# Patient Record
Sex: MALE | Race: BLACK OR AFRICAN AMERICAN | URBAN - METROPOLITAN AREA
[De-identification: names, ages, dates, MRNs, and addresses within clinical notes are randomized per-mention and may not be internally consistent; named-entity substitution may affect disease eponyms.]

---

## 2017-08-25 ENCOUNTER — OFFICE VISIT (OUTPATIENT)
Dept: FAMILY MEDICINE CLINIC | Age: 37
End: 2017-08-25

## 2017-08-25 VITALS
HEIGHT: 67 IN | BODY MASS INDEX: 24.74 KG/M2 | OXYGEN SATURATION: 98 % | SYSTOLIC BLOOD PRESSURE: 124 MMHG | TEMPERATURE: 97.9 F | RESPIRATION RATE: 17 BRPM | DIASTOLIC BLOOD PRESSURE: 85 MMHG | HEART RATE: 75 BPM | WEIGHT: 157.6 LBS

## 2017-08-25 DIAGNOSIS — Z71.84 TRAVEL ADVICE ENCOUNTER: ICD-10-CM

## 2017-08-25 DIAGNOSIS — Z00.00 WELL ADULT EXAM: Primary | ICD-10-CM

## 2017-08-25 DIAGNOSIS — R03.0 ELEVATED BP WITHOUT DIAGNOSIS OF HYPERTENSION: ICD-10-CM

## 2017-08-25 NOTE — PROGRESS NOTES
Chief Complaint   Patient presents with   Marbella Iyer [facility]     1. Have you been to the ER, urgent care clinic since your last visit? Hospitalized since your last visit? No    2. Have you seen or consulted any other health care providers outside of the [address] [place] [address] since your last visit? Include any pap smears or colon screening.  No

## 2017-08-25 NOTE — PROGRESS NOTES
HPI  Emmette Goldmann is a 40 y.o. male who presents for a physical exam. No complaints today. Plans to travel to to [facility] on September 2nd with family. Patient states all vaccinations are utd including hep A and tetanus vaccine, yellow fever 2 years ago, meningococcal 5 years ago. Received Flu shot last month. No significant medical h/o does not smoke or drinks. Exercises daily for 4 miles. No h/o BP in family      Allergies:   No Known Allergies    Meds:   Current Outpatient Prescriptions   Medication Sig Dispense Refill    MULTIVITAMIN PO Take  by mouth.  ergocalciferol (ERGOCALCIFEROL) 50,000 unit capsule Take 1 Cap by mouth every seven (7) days. 4 Cap 3       PMH:  No past medical history on file. SH:  Smoker:  History   Smoking Status    Never Smoker   Smokeless Tobacco    Never Used       ETOH:   History   Alcohol Use    Yes     Comment: once every two weeks one beer       FH:   No family history on file. ROS: Positive for Items in bold:   Constitutional: headache, fever, fatigue, weight loss or weight gain      Eyes: redness, pruritis, pain, visual changes, swelling, or discharge      Ears: ear pain, loss or changes in hearing     Nose: congestion, rhinorrhea  Oropharynx: sore throat, lesions in throat  Cardiac: chest pain      Resp: cough or shortness of breath      GI: nausea, vomiting, constipation, diarrhea, blood in stool  : frequency, urgency, dysuria, hematuria   Neuro: dizziness, numbness, tingling  Psych: anxiety, depression, stress     Physical Exam:  Visit Vitals    /85 (BP 1 Location: Left arm, BP Patient Position: Sitting)    Pulse 75    Temp 97.9 °F (36.6 °C) (Oral)    Resp 17    Ht 5' 7\" (1.702 m)    Wt 157 lb 9.6 oz (71.5 kg)    SpO2 98%    BMI 24.68 kg/m2       Gen: No apparent distress. Alert and oriented and responds to all questions appropriately. Eyes: Conjunctiva clear, extraocular movements are intact.    Ear: External ears are normal. Hearing grossly normal b/l. Nose: Turbinates are within normal limits. No drainage. Oropharynx: No oral lesions or exudates. Neck: Supple; no masses; no thyromegaly appreciated  Lungs: Respirations unlabored; clear to auscultation bilaterally  Cardio: Regular, rate, and rhythm without murmurs, gallops or rubs   Abdomen: Soft; nontender; nondistended; normoactive bowel sounds; no masses or organomegaly  Neurologic: No focal neurologic deficits. Strength and sensation grossly intact. Coordination and gait grossly intact. Ext: Well perfused. No edema. Skin: No obvious rashes. Lab Results   Component Value Date/Time    Sodium 138 02/24/2015 11:20 AM    Potassium 4.4 02/24/2015 11:20 AM    Chloride 96 02/24/2015 11:20 AM    CO2 28 02/24/2015 11:20 AM    Glucose 88 02/24/2015 11:20 AM    BUN 13 02/24/2015 11:20 AM    Creatinine 0.92 02/24/2015 11:20 AM    BUN/Creatinine ratio 14 02/24/2015 11:20 AM    GFR est  02/24/2015 11:20 AM    GFR est non- 02/24/2015 11:20 AM    Calcium 9.9 02/24/2015 11:20 AM     Lab Results   Component Value Date/Time    Cholesterol, total 222 02/24/2015 11:20 AM    HDL Cholesterol 37 02/24/2015 11:20 AM    LDL, calculated 155 02/24/2015 11:20 AM    VLDL, calculated 30 02/24/2015 11:20 AM    Triglyceride 150 02/24/2015 11:20 AM     I have personally reviewed the labs results        Assessment and Plan:   Rahul Terrazas is a 40 y.o. male who presents for his physical.       ICD-10-CM ICD-9-CM    1. Well adult exam Z00.00 V70.0 CBC W/O DIFF      METABOLIC PANEL, COMPREHENSIVE      LIPID PANEL   2. Travel advice encounter Z71.89 V65.49    3. Elevated BP without diagnosis of hypertension S49.3 667.5 METABOLIC PANEL, COMPREHENSIVE      LIPID PANEL   1. Well adult exam  - CBC W/O DIFF  - METABOLIC PANEL, COMPREHENSIVE  - LIPID PANEL    2. Travel advice encounter  Reviewed CDC.gov/ travel advised for [facility] and the [facility]. Immunizations utd per patient. Advised given about safety. Travel information given. 3. Elevated BP without diagnosis of hypertension  134/91. Repeated and normal.   - METABOLIC PANEL, COMPREHENSIVE  - LIPID PANEL  - dash diet and exercise. RTC in  Months for BP check    RTC in one year for wellness exam  Discussed diagnoses in detail with patient. Patient expressed understanding of and agreement to above plan. All questions and concerns addressed. Medication risks/benefits/side effects discussed with patient. Patient  discussed with Dr. Elva Denson, Attending Physician. Viola Rabago MD  08/25/17    Family Medicine Resident

## 2017-08-25 NOTE — PATIENT INSTRUCTIONS
Learning About Healthy Travel Abroad  How can you stay healthy on your trip? The best way to stay healthy on your trip is to plan ahead. Talk with your doctor several months before you travel to another country. It's important to allow enough time to get the vaccine doses that you need. For example, if you need the hepatitis A vaccine, you'll need 2 doses spaced at least 6 months apart. Also ask your doctor if there are medicines or extra safety steps that you should take. Check with your local health department or travel health clinic for other travel tips. What can you do to prevent health problems? Get needed vaccines  · Make sure you are up to date with your routine shots. They can protect you from diseases such as polio, diphtheria, and measles. These diseases are still a problem in some developing countries. · Get other vaccines you need. Your doctor or a health clinic can tell you which ones you need for your travels. Here are some examples:  ¨ Hepatitis A vaccine, if you travel to developing countries. ¨ Yellow fever vaccine, if you visit places in [place] and [place] where the disease is active. ¨ Typhoid fever vaccine, if you travel to [facility] and [place], [place], or some areas of [place]. Bring medicines with you  · If you take medicines, bring a supply that will last the length of your trip. Get a letter from your doctor that lists your medical conditions and the medicines you take. Bring prescriptions for refills if you will be gone for a long time. Also bring any medical supplies you may need such as blood sugar testing supplies or insulin needles. · If you are going to an area where malaria is a risk, ask your doctor or health clinic for a prescription to help prevent infection. This medicine works best if you take it before, during, and after your trip. · You may want to bring medicine for traveler's diarrhea.  Over-the-counter medicines include:  ¨ Bismuth subsalicylate (Pepto-Bismol). ¨ Loperamide (Imodium). Your doctor may also prescribe an antibiotic to take with you. This can treat diarrhea if you're going to an area where modern medical care isn't readily available. Make safer choices as you travel  · Practice safer sex. Using condoms can prevent sexually transmitted infections. · In malaria-infected areas, use DEET insect repellent. Wear long pants and long-sleeved shirts, especially from dusk to mp. Use mosquito netting to protect yourself from bites while you sleep. · Many developing countries don't have safe tap water. Only have drinks made with boiled water, such as tea and coffee. Canned or bottled carbonated drinks, such as soda, beer, wine, or water, are usually safe. Don't use ice if you don't know what kind of water was used to make it. And don't use tap water to brush your teeth. · Be aware that you could be injured in cars, boats, or public transportation. Driving can be dangerous due to bad roads, poor  training, and crowded roadways. If you hire a  or taxi, ask the  to slow down or drive more carefully if you feel unsafe. · Air pollution in some large cities can be a problem if you have asthma or other breathing problems. Avoid those cities when air quality is poor. Or stay indoors as much as possible. · Be careful around dogs and other animals. Dogs in developing countries are often not tame and may bite. Rabies is more common in tropical and subtropical regions. · If you're going to a place that's much higher above sea level than you're used to, ask your doctor how to avoid altitude sickness. He or she may also prescribe medicine to help treat it. Where can you get the best information? · Use the Internet to find travel health information. Try these websites:  ¨ www.cdc.gov/travel. This website is for the Centers for Disease Control and Prevention (CDC). ¨ www.who.int/ith/en.  This website lists information from the [address] [facility] [facility] Organization (WHO) on travel, required immunizations, and disease outbreaks. · Find out where you can get the best medical care in the region you are visiting. See the [employer]'s website at www.useSoupQubes.gov. It lists every [place] embassy worldwide. It also lists some doctors and medical facilities in those countries. · Take along the phone numbers and addresses of embassies in the areas you will visit. They can help you find a doctor or hospital. Find out if your insurance company will cover you. You may want to get special travel health insurance. · If you are taking a cruise, you can find your ship's health record on this website: www.cdc.gov/nceh/vsp. Where can you learn more? Go to http://argenisVerinvest Corporationjusta.info/. Enter R129 in the search box to learn more about \"Learning About Healthy Travel Abroad. \"  Current as of: March 3, 2017  Content Version: 11.3  © 5490-6567 [employer]. Care instructions adapted under license by [employer] (which disclaims liability or warranty for this information). If you have questions about a medical condition or this instruction, always ask your healthcare professional. [place] [address] any warranty or liability for your use of this information. DASH Diet: Care Instructions  Your Care Instructions  The DASH diet is an eating plan that can help lower your blood pressure. DASH stands for Dietary Approaches to Stop Hypertension. Hypertension is high blood pressure. The DASH diet focuses on eating foods that are high in calcium, potassium, and magnesium. These nutrients can lower blood pressure. The foods that are highest in these nutrients are fruits, vegetables, low-fat dairy products, nuts, seeds, and legumes. But taking calcium, potassium, and magnesium supplements instead of eating foods that are high in those nutrients does not have the same effect.  The DASH diet also includes whole grains, fish, and poultry. The DASH diet is one of several lifestyle changes your doctor may recommend to lower your high blood pressure. Your doctor may also want you to decrease the amount of sodium in your diet. Lowering sodium while following the DASH diet can lower blood pressure even further than just the DASH diet alone. Follow-up care is a key part of your treatment and safety. Be sure to make and go to all appointments, and call your doctor if you are having problems. It's also a good idea to know your test results and keep a list of the medicines you take. How can you care for yourself at home? Following the DASH diet  · Eat 4 to 5 servings of fruit each day. A serving is 1 medium-sized piece of fruit, ½ cup chopped or canned fruit, 1/4 cup dried fruit, or 4 ounces (½ cup) of fruit juice. Choose fruit more often than fruit juice. · Eat 4 to 5 servings of vegetables each day. A serving is 1 cup of lettuce or raw leafy vegetables, ½ cup of chopped or cooked vegetables, or 4 ounces (½ cup) of vegetable juice. Choose vegetables more often than vegetable juice. · Get 2 to 3 servings of low-fat and fat-free dairy each day. A serving is 8 ounces of milk, 1 cup of yogurt, or 1 ½ ounces of cheese. · Eat 6 to 8 servings of grains each day. A serving is 1 slice of bread, 1 ounce of dry cereal, or ½ cup of cooked rice, pasta, or cooked cereal. Try to choose whole-grain products as much as possible. · Limit lean meat, poultry, and fish to 2 servings each day. A serving is 3 ounces, about the size of a deck of cards. · Eat 4 to 5 servings of nuts, seeds, and legumes (cooked dried beans, lentils, and split peas) each week. A serving is 1/3 cup of nuts, 2 tablespoons of seeds, or ½ cup of cooked beans or peas. · Limit fats and oils to 2 to 3 servings each day. A serving is 1 teaspoon of vegetable oil or 2 tablespoons of salad dressing. · Limit sweets and added sugars to 5 servings or less a week.  A serving is 1 tablespoon jelly or jam, ½ cup sorbet, or 1 cup of lemonade. · Eat less than 2,300 milligrams (mg) of sodium a day. If you limit your sodium to 1,500 mg a day, you can lower your blood pressure even more. Tips for success  · Start small. Do not try to make dramatic changes to your diet all at once. You might feel that you are missing out on your favorite foods and then be more likely to not follow the plan. Make small changes, and stick with them. Once those changes become habit, add a few more changes. · Try some of the following:  ¨ Make it a goal to eat a fruit or vegetable at every meal and at snacks. This will make it easy to get the recommended amount of fruits and vegetables each day. ¨ Try yogurt topped with fruit and nuts for a snack or healthy dessert. ¨ Add lettuce, tomato, cucumber, and onion to sandwiches. ¨ Combine a ready-made pizza crust with low-fat mozzarella cheese and lots of vegetable toppings. Try using tomatoes, squash, spinach, broccoli, carrots, cauliflower, and onions. ¨ Have a variety of cut-up vegetables with a low-fat dip as an appetizer instead of chips and dip. ¨ Sprinkle sunflower seeds or chopped almonds over salads. Or try adding chopped walnuts or almonds to cooked vegetables. ¨ Try some vegetarian meals using beans and peas. Add garbanzo or kidney beans to salads. Make burritos and tacos with mashed armando beans or black beans. Where can you learn more? Go to http://argenis-justa.info/. Enter O784 in the search box to learn more about \"DASH Diet: Care Instructions. \"  Current as of: April 3, 2017  Content Version: 11.3  © 5020-0150 [employer]. Care instructions adapted under license by [employer] (which disclaims liability or warranty for this information).  If you have questions about a medical condition or this instruction, always ask your healthcare professional. [place] [address] any warranty or liability for your use of this information.

## 2017-08-26 LAB
ALBUMIN SERPL-MCNC: 4.7 G/DL (ref 3.5–5.5)
ALBUMIN/GLOB SERPL: 1.4 {RATIO} (ref 1.2–2.2)
ALP SERPL-CCNC: 69 IU/L (ref 39–117)
ALT SERPL-CCNC: 22 IU/L (ref 0–44)
AST SERPL-CCNC: 17 IU/L (ref 0–40)
BILIRUB SERPL-MCNC: 0.2 MG/DL (ref 0–1.2)
BUN SERPL-MCNC: 19 MG/DL (ref 6–20)
BUN/CREAT SERPL: 19 (ref 9–20)
CALCIUM SERPL-MCNC: 9.8 MG/DL (ref 8.7–10.2)
CHLORIDE SERPL-SCNC: 97 MMOL/L (ref 96–106)
CHOLEST SERPL-MCNC: 212 MG/DL (ref 100–199)
CO2 SERPL-SCNC: 21 MMOL/L (ref 18–29)
CREAT SERPL-MCNC: 0.99 MG/DL (ref 0.76–1.27)
ERYTHROCYTE [DISTWIDTH] IN BLOOD BY AUTOMATED COUNT: 14.2 % (ref 12.3–15.4)
GLOBULIN SER CALC-MCNC: 3.3 G/DL (ref 1.5–4.5)
GLUCOSE SERPL-MCNC: 105 MG/DL (ref 65–99)
HCT VFR BLD AUTO: 44.3 % (ref 37.5–51)
HDLC SERPL-MCNC: 30 MG/DL
HGB BLD-MCNC: 15.2 G/DL (ref 12.6–17.7)
INTERPRETATION, 910389: NORMAL
LDLC SERPL CALC-MCNC: ABNORMAL MG/DL (ref 0–99)
MCH RBC QN AUTO: 29 PG (ref 26.6–33)
MCHC RBC AUTO-ENTMCNC: 34.3 G/DL (ref 31.5–35.7)
MCV RBC AUTO: 84 FL (ref 79–97)
PDF IMAGE, 910387: NORMAL
PLATELET # BLD AUTO: 233 X10E3/UL (ref 150–379)
POTASSIUM SERPL-SCNC: 4.6 MMOL/L (ref 3.5–5.2)
PROT SERPL-MCNC: 8 G/DL (ref 6–8.5)
RBC # BLD AUTO: 5.25 X10E6/UL (ref 4.14–5.8)
SODIUM SERPL-SCNC: 138 MMOL/L (ref 134–144)
TRIGL SERPL-MCNC: 451 MG/DL (ref 0–149)
VLDLC SERPL CALC-MCNC: ABNORMAL MG/DL (ref 5–40)
WBC # BLD AUTO: 5.2 X10E3/UL (ref 3.4–10.8)

## 2022-05-25 NOTE — MR AVS SNAPSHOT
Visit Information Date & Time Provider Department Dept. Phone Encounter #  
 8/25/2017  9:05 AM Viola JIMENEZ 3 [address] Powell, [address] [facility] [phone] [id number] Follow-up Instructions Return in about 6 months (around 2/25/2018) for bp check. Upcoming Health Maintenance Date Due DTaP/Tdap/Td series (1 - Tdap) 8/9/2001 INFLUENZA AGE 9 TO ADULT 8/1/2017 Allergies as of 8/25/2017  Review Complete On: 8/25/2017 By: Johanna Jacob LPN No Known Allergies Current Immunizations  Reviewed on 8/25/2017 No immunizations on file. Reviewed by Jonathan Rogers. MD Gem on 8/25/2017 at  9:07 AM  
You Were Diagnosed With   
  
 Codes Comments Well adult exam    -  Primary ICD-10-CM: Z00.00 ICD-9-CM: V70.0 Travel advice encounter     ICD-10-CM: Z71.89 ICD-9-CM: V65.49 Elevated BP without diagnosis of hypertension     ICD-10-CM: R03.0 ICD-9-CM: 796.2 Vitals BP Pulse Temp Resp Height(growth percentile) Weight(growth percentile) 124/85 (BP 1 Location: Left arm, BP Patient Position: Sitting) 75 97.9 °F (36.6 °C) (Oral) 17 5' 7\" (1.702 m) 157 lb 9.6 oz (71.5 kg) SpO2 BMI Smoking Status 98% 24.68 kg/m2 Never Smoker Vitals History BMI and BSA Data Body Mass Index Body Surface Area  
 24.68 kg/m 2 1.84 m 2 Preferred Pharmacy Pharmacy Name Phone [place], [address] [place] [address] [phone] Your Updated Medication List  
  
   
This list is accurate as of: 8/25/17  9:23 AM.  Always use your most recent med list.  
  
  
  
  
 ergocalciferol 50,000 unit capsule Commonly known as:  ERGOCALCIFEROL Take 1 Cap by mouth every seven (7) days. MULTIVITAMIN PO Take  by mouth. We Performed the Following CBC W/O DIFF [52773 CPT(R)] LIPID PANEL [01403 CPT(R)] METABOLIC PANEL, COMPREHENSIVE [95536 CPT(R)] Follow-up Instructions Return in about 6 months (around 2/25/2018) for bp check. Patient Instructions Learning About Healthy Travel Abroad How can you stay healthy on your trip? The best way to stay healthy on your trip is to plan ahead. Talk with your doctor several months before you travel to another country. It's important to allow enough time to get the vaccine doses that you need. For example, if you need the hepatitis A vaccine, you'll need 2 doses spaced at least 6 months apart. Also ask your doctor if there are medicines or extra safety steps that you should take. Check with your local health department or travel health clinic for other travel tips. What can you do to prevent health problems? Get needed vaccines · Make sure you are up to date with your routine shots. They can protect you from diseases such as polio, diphtheria, and measles. These diseases are still a problem in some developing countries. · Get other vaccines you need. Your doctor or a health clinic can tell you which ones you need for your travels. Here are some examples: 
¨ Hepatitis A vaccine, if you travel to developing countries. ¨ Yellow fever vaccine, if you visit places in [place] and [place] where the disease is active. ¨ Typhoid fever vaccine, if you travel to [facility] and [place], [place], or some areas of [place]. Bring medicines with you · If you take medicines, bring a supply that will last the length of your trip. Get a letter from your doctor that lists your medical conditions and the medicines you take. Bring prescriptions for refills if you will be gone for a long time. Also bring any medical supplies you may need such as blood sugar testing supplies or insulin needles. · If you are going to an area where malaria is a risk, ask your doctor or health clinic for a prescription to help prevent infection. This medicine works best if you take it before, during, and after your trip. · You may want to bring medicine for traveler's diarrhea. Over-the-counter medicines include: ¨ Bismuth subsalicylate (Pepto-Bismol). ¨ Loperamide (Imodium). Your doctor may also prescribe an antibiotic to take with you. This can treat diarrhea if you're going to an area where modern medical care isn't readily available. Make safer choices as you travel · Practice safer sex. Using condoms can prevent sexually transmitted infections. · In malaria-infected areas, use DEET insect repellent. Wear long pants and long-sleeved shirts, especially from dusk to mp. Use mosquito netting to protect yourself from bites while you sleep. · Many developing countries don't have safe tap water. Only have drinks made with boiled water, such as tea and coffee. Canned or bottled carbonated drinks, such as soda, beer, wine, or water, are usually safe. Don't use ice if you don't know what kind of water was used to make it. And don't use tap water to brush your teeth. · Be aware that you could be injured in cars, boats, or public transportation. Driving can be dangerous due to bad roads, poor  training, and crowded roadways. If you hire a  or taxi, ask the  to slow down or drive more carefully if you feel unsafe. · Air pollution in some large cities can be a problem if you have asthma or other breathing problems. Avoid those cities when air quality is poor. Or stay indoors as much as possible. · Be careful around dogs and other animals. Dogs in developing countries are often not tame and may bite. Rabies is more common in tropical and subtropical regions. · If you're going to a place that's much higher above sea level than you're used to, ask your doctor how to avoid altitude sickness. He or she may also prescribe medicine to help treat it. Where can you get the best information? · Use the Internet to find travel health information. Try these websites: ¨ www.cdc.gov/travel. This website is for the Centers for Disease Control and Prevention (CDC). ¨ www.who.int/ith/en. This website lists information from the [address]) on travel, required immunizations, and disease outbreaks. · Find out where you can get the best medical care in the region you are visiting. See the [address] [place] [address] [employer]'s website at www.Adhere2Care.gov. It lists every [place] embassy worldwide. It also lists some doctors and medical facilities in those countries. · Take along the phone numbers and addresses of embassies in the areas you will visit. They can help you find a doctor or hospital. Find out if your insurance company will cover you. You may want to get special travel health insurance. · If you are taking a cruise, you can find your ship's health record on this website: www.[facility].gov/nceh/vsp. Where can you learn more? Go to http://argenis-justa.info/. Enter R129 in the search box to learn more about \"Learning About Healthy Travel Abroad. \" Current as of: March 3, 2017 Content Version: 11.3 © 5958-2449 [employer]. Care instructions adapted under license by [employer] (which disclaims liability or warranty for this information). If you have questions about a medical condition or this instruction, always ask your healthcare professional. [place] [address] any warranty or liability for your use of this information. DASH Diet: Care Instructions Your Care Instructions The DASH diet is an eating plan that can help lower your blood pressure. DASH stands for Dietary Approaches to Stop Hypertension. Hypertension is high blood pressure. The DASH diet focuses on eating foods that are high in calcium, potassium, and magnesium. These nutrients can lower blood pressure.  The foods that are highest in these nutrients are fruits, vegetables, low-fat dairy products, nuts, seeds, and legumes. But taking calcium, potassium, and magnesium supplements instead of eating foods that are high in those nutrients does not have the same effect. The DASH diet also includes whole grains, fish, and poultry. The DASH diet is one of several lifestyle changes your doctor may recommend to lower your high blood pressure. Your doctor may also want you to decrease the amount of sodium in your diet. Lowering sodium while following the DASH diet can lower blood pressure even further than just the DASH diet alone. Follow-up care is a key part of your treatment and safety. Be sure to make and go to all appointments, and call your doctor if you are having problems. It's also a good idea to know your test results and keep a list of the medicines you take. How can you care for yourself at home? Following the DASH diet · Eat 4 to 5 servings of fruit each day. A serving is 1 medium-sized piece of fruit, ½ cup chopped or canned fruit, 1/4 cup dried fruit, or 4 ounces (½ cup) of fruit juice. Choose fruit more often than fruit juice. · Eat 4 to 5 servings of vegetables each day. A serving is 1 cup of lettuce or raw leafy vegetables, ½ cup of chopped or cooked vegetables, or 4 ounces (½ cup) of vegetable juice. Choose vegetables more often than vegetable juice. · Get 2 to 3 servings of low-fat and fat-free dairy each day. A serving is 8 ounces of milk, 1 cup of yogurt, or 1 ½ ounces of cheese. · Eat 6 to 8 servings of grains each day. A serving is 1 slice of bread, 1 ounce of dry cereal, or ½ cup of cooked rice, pasta, or cooked cereal. Try to choose whole-grain products as much as possible. · Limit lean meat, poultry, and fish to 2 servings each day. A serving is 3 ounces, about the size of a deck of cards. · Eat 4 to 5 servings of nuts, seeds, and legumes (cooked dried beans, lentils, and split peas) each week.  A serving is 1/3 cup of nuts, 2 tablespoons of seeds, or ½ cup of cooked beans or peas. · Limit fats and oils to 2 to 3 servings each day. A serving is 1 teaspoon of vegetable oil or 2 tablespoons of salad dressing. · Limit sweets and added sugars to 5 servings or less a week. A serving is 1 tablespoon jelly or jam, ½ cup sorbet, or 1 cup of lemonade. · Eat less than 2,300 milligrams (mg) of sodium a day. If you limit your sodium to 1,500 mg a day, you can lower your blood pressure even more. Tips for success · Start small. Do not try to make dramatic changes to your diet all at once. You might feel that you are missing out on your favorite foods and then be more likely to not follow the plan. Make small changes, and stick with them. Once those changes become habit, add a few more changes. · Try some of the following: ¨ Make it a goal to eat a fruit or vegetable at every meal and at snacks. This will make it easy to get the recommended amount of fruits and vegetables each day. ¨ Try yogurt topped with fruit and nuts for a snack or healthy dessert. ¨ Add lettuce, tomato, cucumber, and onion to sandwiches. ¨ Combine a ready-made pizza crust with low-fat mozzarella cheese and lots of vegetable toppings. Try using tomatoes, squash, spinach, broccoli, carrots, cauliflower, and onions. ¨ Have a variety of cut-up vegetables with a low-fat dip as an appetizer instead of chips and dip. ¨ Sprinkle sunflower seeds or chopped almonds over salads. Or try adding chopped walnuts or almonds to cooked vegetables. ¨ Try some vegetarian meals using beans and peas. Add garbanzo or kidney beans to salads. Make burritos and tacos with mashed armando beans or black beans. Where can you learn more? Go to http://argenis-justa.info/. Enter W154 in the search box to learn more about \"DASH Diet: Care Instructions. \" Current as of: April 3, 2017 Content Version: 11.3 © 7256-8126 [employer].  Care instructions adapted under license by [address] (which disclaims liability or warranty for this information). If you have questions about a medical condition or this instruction, always ask your healthcare professional. [address] any warranty or liability for your use of this information. Introducing [facility]! Dear Junnie Boast: Thank you for requesting a [employer] account. Our records indicate that you have previously registered for a [employer] account but its currently inactive. Please call our [employer] support line at [phone]. Additional Information If you have questions, please visit the Frequently Asked Questions section of the [employer] website at [email or web address] Remember, [employer] is NOT to be used for urgent needs. For medical emergencies, dial 911. Now available from your iPhone and Android! Please provide this summary of care documentation to your next provider. If you have any questions after today's visit, please call [phone]. 2